# Patient Record
Sex: MALE | Race: WHITE | NOT HISPANIC OR LATINO | URBAN - METROPOLITAN AREA
[De-identification: names, ages, dates, MRNs, and addresses within clinical notes are randomized per-mention and may not be internally consistent; named-entity substitution may affect disease eponyms.]

---

## 2023-05-17 ENCOUNTER — OFFICE VISIT (OUTPATIENT)
Dept: UROLOGY | Facility: CLINIC | Age: 38
End: 2023-05-17
Payer: COMMERCIAL

## 2023-05-17 VITALS — BODY MASS INDEX: 33.99 KG/M2 | WEIGHT: 237.44 LBS | HEIGHT: 70 IN

## 2023-05-17 DIAGNOSIS — Z30.2 ENCOUNTER FOR STERILIZATION: Primary | ICD-10-CM

## 2023-05-17 PROCEDURE — 99999 PR PBB SHADOW E&M-NEW PATIENT-LVL III: CPT | Mod: PBBFAC,,, | Performed by: STUDENT IN AN ORGANIZED HEALTH CARE EDUCATION/TRAINING PROGRAM

## 2023-05-17 PROCEDURE — 3008F PR BODY MASS INDEX (BMI) DOCUMENTED: ICD-10-PCS | Mod: CPTII,S$GLB,, | Performed by: STUDENT IN AN ORGANIZED HEALTH CARE EDUCATION/TRAINING PROGRAM

## 2023-05-17 PROCEDURE — 1160F RVW MEDS BY RX/DR IN RCRD: CPT | Mod: CPTII,S$GLB,, | Performed by: STUDENT IN AN ORGANIZED HEALTH CARE EDUCATION/TRAINING PROGRAM

## 2023-05-17 PROCEDURE — 99203 OFFICE O/P NEW LOW 30 MIN: CPT | Mod: S$GLB,,, | Performed by: STUDENT IN AN ORGANIZED HEALTH CARE EDUCATION/TRAINING PROGRAM

## 2023-05-17 PROCEDURE — 3008F BODY MASS INDEX DOCD: CPT | Mod: CPTII,S$GLB,, | Performed by: STUDENT IN AN ORGANIZED HEALTH CARE EDUCATION/TRAINING PROGRAM

## 2023-05-17 PROCEDURE — 1159F MED LIST DOCD IN RCRD: CPT | Mod: CPTII,S$GLB,, | Performed by: STUDENT IN AN ORGANIZED HEALTH CARE EDUCATION/TRAINING PROGRAM

## 2023-05-17 PROCEDURE — 1160F PR REVIEW ALL MEDS BY PRESCRIBER/CLIN PHARMACIST DOCUMENTED: ICD-10-PCS | Mod: CPTII,S$GLB,, | Performed by: STUDENT IN AN ORGANIZED HEALTH CARE EDUCATION/TRAINING PROGRAM

## 2023-05-17 PROCEDURE — 99999 PR PBB SHADOW E&M-NEW PATIENT-LVL III: ICD-10-PCS | Mod: PBBFAC,,, | Performed by: STUDENT IN AN ORGANIZED HEALTH CARE EDUCATION/TRAINING PROGRAM

## 2023-05-17 PROCEDURE — 1159F PR MEDICATION LIST DOCUMENTED IN MEDICAL RECORD: ICD-10-PCS | Mod: CPTII,S$GLB,, | Performed by: STUDENT IN AN ORGANIZED HEALTH CARE EDUCATION/TRAINING PROGRAM

## 2023-05-17 PROCEDURE — 99203 PR OFFICE/OUTPT VISIT, NEW, LEVL III, 30-44 MIN: ICD-10-PCS | Mod: S$GLB,,, | Performed by: STUDENT IN AN ORGANIZED HEALTH CARE EDUCATION/TRAINING PROGRAM

## 2023-05-17 RX ORDER — FLUOXETINE 10 MG/1
CAPSULE ORAL
COMMUNITY
Start: 2022-12-18

## 2023-05-17 RX ORDER — CETIRIZINE HYDROCHLORIDE 10 MG/1
10 TABLET ORAL DAILY
COMMUNITY

## 2023-05-17 RX ORDER — DIAZEPAM 5 MG/1
5 TABLET ORAL EVERY 6 HOURS PRN
Qty: 2 TABLET | Refills: 0 | Status: SHIPPED | OUTPATIENT
Start: 2023-05-17

## 2023-05-17 NOTE — PATIENT INSTRUCTIONS
- Vasectomy is intended to be a permanent form of contraception.  - Vasectomy does not produce immediate sterility.  - Following vasectomy, another form of contraception is required until vas occlusion is confirmed by post-vasectomy semen analysis (PVSA).  - Even after vas occlusion is confirmed, vasectomy is not 100% reliable in preventing pregnancy.  - The risk of pregnancy after vasectomy is approximately 1 in 2,000 for men who have post-vasectomy azoospermia or PVSA showing rare non-motile sperm (RNMS).  - Repeat vasectomy is necessary in ?1% of vasectomies, provided that a technique for vas occlusion known to have a low occlusive failure rate has been used.  - Patients should refrain from ejaculation for approximately one week after vasectomy.  - Options for fertility after vasectomy include vasectomy reversal and sperm retrieval with in vitro fertilization. These options are not always successful, and they may be expensive.  - The rates of surgical complications such as symptomatic hematoma and infection are 1-2%. These rates vary with the surgeon's experience and the criteria used to diagnose these conditions.  - Chronic scrotal pain associated with negative impact on quality of life occurs after vasectomy in about 1-2% of men. Few of these men require additional surgery.  - Other permanent and non-permanent alternatives to vasectomy are available.      Vasectomy Instructions     A vasectomy is a permanent type of birth control. After this surgery, you are very unlikely to get a person pregnant. Sperm are made in the testes. The testes are small round organs located in the skin sac that hangs between your legs. Sperm are stored in a small organ on top of the testes. The organ is called the epididymis. The sperm travel from the epididymis through a small tube called the vas deferens when you ejaculate. The fluid you ejaculate is called semen. The sperm in this fluid can cause a pregnancy.  The doctor cuts or  blocks the vas deferens during a vasectomy. After this procedure, you will still ejaculate but the semen will not contain any sperm.     What care is needed at home?   Okay to shower 24 hours after your procedure  Do not soak your incisions in the bath tub or pool for 1 week.   No dressings are needed but you can place gauze as needed if there is drainage  If there is bleeding, you can hold pressure near the incision for a few minutes and this should stop  Wear supportive underwear like a jockstrap, athletic shorts, or tight fitting briefs for at least 1 week after the procedure  This will help with pain, swelling, and bleeding risk  Do not lift anything over 10 pounds for the first week  Place an ice pack or a bag of frozen peas wrapped in a towel over the painful part. Never put ice right on the skin. Do not leave the ice on more than 10 to 15 minutes at a time.  No ejaculations for 1 week after the procedure.  Relax and keep your feet raised when you get home. Lying on your back may feel most comfortable.  You may have blood in your semen for the first few weeks. This is normal and does not require evaluation.   You are not sterile immediately. You can still get your partner pregnant during this time. You need to use other forms of birth control to prevent pregnancy.  You may have to wait up to 3 months to have a zero sperm count. You will need to get your sperm count checked. Use birth control until your sperm count is checked and is zero.  Please bring a semen sample at 8 weeks and 10 weeks after your procedure  After the first week, try to have 20 ejaculations prior to giving your first sample    What follow-up care is needed?   You will need to follow up with your doctor to have your sperm count checked after about 8 and 10 weeks. Checking with your doctor is an important step in making sure you are not able to get a person pregnant.    What drugs may be needed?   Over the counter ibuprofen, naproxen, or  tylenol are recommended to help with pain and swelling    Will physical activity be limited?   You may need to rest for a few days after the procedure.   Avoid standing or walking too long.   Avoid heavy lifting and hard exercise for up to 1 to 3 weeks.   Avoid constipation.    What problems could happen?   Infection  Bleeding and bruising  Swelling  Pain  Opening of surgical wound  Blood supply to the testicle is damaged  There are still sperm in your semen    When do I need to call the doctor?   Signs of infection such as a fever of 100.4°F (38°C) or higher, chills, pain with passing urine.  Significant signs of wound infection such as severe swelling, redness, warmth around the wound; too much pain when touched; yellowish, greenish, or bloody discharge; foul smell coming from the cut site; cut site opens up.  Very bad pain in your scrotum  Problems with your erection or ejaculation    Helpful tips   A vasectomy is meant to be a permanent form of contraception. You should be 100% sure before you undergo this procedure. You and your partner should have talked about this procedure thoroughly. Vasectomy reversal surgery is available, but it is complicated, expensive, and does not always work.  A vasectomy does not protect you from sexually-transmitted diseases. You will still need to wear a condom to protect yourself and your partner against these infections.    Where can I learn more?   American Academy [employer]  https://familydoctor.org/vasectomy-what-to-expect/   [employer]  https://www.betterhealth.[place]/health/healthyliving/contraception-vasectomy   NHS Choices  https://www.nhs.uk/Conditions/contraception-guide/Pages/vasectomy-male-sterilisation.aspx     Last Reviewed Date   2021-06-07  Consumer Information Use and Disclaimer   This information is not specific medical advice and does not replace information you receive from your health care provider. This is only a brief summary of  general information. It does NOT include all information about conditions, illnesses, injuries, tests, procedures, treatments, therapies, discharge instructions or life-style choices that may apply to you. You must talk with your health care provider for complete information about your health and treatment options. This information should not be used to decide whether or not to accept your health care providers advice, instructions or recommendations. Only your health care provider has the knowledge and training to provide advice that is right for you.  Copyright   Copyright © 2021 [employer] and its affiliates and/or licensors. All rights reserved.

## 2023-05-17 NOTE — PROGRESS NOTES
"[facility]   Clinic Note    SUBJECTIVE:     Chief Complaint: evaluation for vasectomy    History of Present Illness:  Sean Cleveland is a 37 y.o. male who presents to clinic for evaluation for a vasectomy. He is new to our clinic.    He has a 2.5 year old daughter . He is certain that he desires no more. His wife is on OCP for contraception. He is aware of other forms of contraception. He is aware that vasectomy is to be considered permanent/irreversible.    He reports no previous scrotal surgeries.    He works in marine repair    Anticoagulation:  No    OBJECTIVE:     Estimated body mass index is 34.07 kg/m² as calculated from the following:    Height as of this encounter: 5' 10" (1.778 m).    Weight as of this encounter: 107.7 kg (237 lb 7 oz).    Vital Signs (Most Recent)       Physical Exam    Lab Results   Component Value Date    WBC 10.20 06/13/2005    HGB 14.6 06/13/2005    HCT 42.1 06/13/2005     06/13/2005          ASSESSMENT     1. Encounter for sterilization      PLAN:   Sean was seen today for sterilization consult.    Diagnoses and all orders for this visit:    Encounter for sterilization  -     diazePAM (VALIUM) 5 MG tablet; Take 1 tablet (5 mg total) by mouth every 6 (six) hours as needed for Anxiety (prior to procedure). Take 30-60 minutes prior to procedure       - Vasectomy is intended to be a permanent form of contraception.  - Vasectomy does not produce immediate sterility.  - Following vasectomy, another form of contraception is required until vas occlusion is confirmed by post-vasectomy semen analysis (PVSA).  - Even after vas occlusion is confirmed, vasectomy is not 100% reliable in preventing pregnancy.  - The risk of pregnancy after vasectomy is approximately 1 in 2,000 for men who have post-vasectomy azoospermia or PVSA showing rare non-motile sperm (RNMS).  - Repeat vasectomy is necessary in ?1% of vasectomies, provided that a technique for vas occlusion known to have a " low occlusive failure rate has been used.  - Patients should refrain from ejaculation for approximately one week after vasectomy.  - Options for fertility after vasectomy include vasectomy reversal and sperm retrieval with in vitro fertilization. These options are not always successful, and they may be expensive.  - The rates of surgical complications such as symptomatic hematoma and infection are 1-2%. These rates vary with the surgeon's experience and the criteria used to diagnose these conditions.  - Chronic scrotal pain associated with negative impact on quality of life occurs after vasectomy in about 1-2% of men. Few of these men require additional surgery.  - Other permanent and non-permanent alternatives to vasectomy are available.     The risks and benefits of vasectomy were discussed with the patient in detail. The risks include bleeding or hematoma, infection, pain, scarring, chronic pain, sperm granuloma, recannulization and loss of testicular function. He was given the chance to ask questions which were answered to his satisfaction. He will be scheduled for vasectomy.     He would like to have valium prior to the procedure and understands that he cannot drive on this medication. A new prescription was sent to his pharmacy.    Alex Thrasher MD